# Patient Record
Sex: MALE | Race: WHITE | NOT HISPANIC OR LATINO | Employment: UNEMPLOYED | URBAN - METROPOLITAN AREA
[De-identification: names, ages, dates, MRNs, and addresses within clinical notes are randomized per-mention and may not be internally consistent; named-entity substitution may affect disease eponyms.]

---
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
## 2025-07-18 ENCOUNTER — APPOINTMENT (OUTPATIENT)
Dept: PEDIATRICS | Facility: CLINIC | Age: 20
End: 2025-07-18
Payer: COMMERCIAL

## 2025-07-18 VITALS
WEIGHT: 167 LBS | DIASTOLIC BLOOD PRESSURE: 75 MMHG | SYSTOLIC BLOOD PRESSURE: 138 MMHG | HEART RATE: 76 BPM | BODY MASS INDEX: 24.73 KG/M2 | HEIGHT: 69 IN

## 2025-07-18 DIAGNOSIS — Z91.018 TREE NUT ALLERGY: ICD-10-CM

## 2025-07-18 DIAGNOSIS — Z00.00 WELL ADULT EXAM: Primary | ICD-10-CM

## 2025-07-18 PROBLEM — S62.308A CLOSED FRACTURE OF 5TH METACARPAL: Status: RESOLVED | Noted: 2020-06-10 | Resolved: 2025-07-18

## 2025-07-18 PROBLEM — J34.829 NASAL VALVE COLLAPSE: Status: RESOLVED | Noted: 2018-07-16 | Resolved: 2025-07-18

## 2025-07-18 PROBLEM — M70.42 PREPATELLAR BURSITIS OF LEFT KNEE: Status: RESOLVED | Noted: 2025-07-18 | Resolved: 2025-07-18

## 2025-07-18 PROCEDURE — 90472 IMMUNIZATION ADMIN EACH ADD: CPT | Performed by: PEDIATRICS

## 2025-07-18 PROCEDURE — 90620 MENB-4C VACCINE IM: CPT | Performed by: PEDIATRICS

## 2025-07-18 PROCEDURE — 3008F BODY MASS INDEX DOCD: CPT | Performed by: PEDIATRICS

## 2025-07-18 PROCEDURE — 90651 9VHPV VACCINE 2/3 DOSE IM: CPT | Performed by: PEDIATRICS

## 2025-07-18 PROCEDURE — 90471 IMMUNIZATION ADMIN: CPT | Performed by: PEDIATRICS

## 2025-07-18 PROCEDURE — 1036F TOBACCO NON-USER: CPT | Performed by: PEDIATRICS

## 2025-07-18 PROCEDURE — 99395 PREV VISIT EST AGE 18-39: CPT | Performed by: PEDIATRICS

## 2025-07-18 RX ORDER — IBUPROFEN 600 MG/1
600 TABLET, FILM COATED ORAL EVERY 8 HOURS PRN
COMMUNITY
Start: 2025-05-15

## 2025-07-18 RX ORDER — METHOCARBAMOL 750 MG/1
TABLET, FILM COATED ORAL
COMMUNITY
Start: 2025-05-15

## 2025-07-18 RX ORDER — EPINEPHRINE 0.3 MG/.3ML
1 INJECTION, SOLUTION INTRAMUSCULAR AS NEEDED
Qty: 2 EACH | Refills: 3 | Status: SHIPPED | OUTPATIENT
Start: 2025-07-18

## 2025-07-18 NOTE — PROGRESS NOTES
"Well Child (Pt by himself for  20 yr [facility])      Concerns:    No allergen exposures   Last allergy visit 2022      Has not used albuterol in a few years     Sleep: well rested and waking up well in the morning   Diet:  Varied diet    aqvoiding allergens     good variety and water   [place]:  soft and regular  no issues   Dental:  brushing twice a day and seeing dentist  School:      soccer   not for college anymore  workouts gym     Activities:    [employer]     studio arts      coaching soccer     Drugs/Alcohol/Tobacco/Vaping: discussed   Sexuality/Puberty: discussed   Depression/Moods:      Exam:       height is 1.759 m (5' 9.25\") and weight is 75.8 kg (167 lb). His blood pressure is 138/75 and his pulse is 76.     General: Well-developed, well-nourished, alert and oriented, no acute distress  Eyes: Normal sclera, IGLESIA, EOMI. Red reflex intact, light reflex symmetric.   ENT: Moist mucous membranes, normal throat, no nasal discharge. TMs are normal.  Cardiac:  Normal S1/S2, regular rhythm. Capillary refill less than 2 seconds. No clinically significant murmurs.    Pulmonary: Clear to auscultation bilaterally, no work of breathing.  GI: Soft nontender nondistended abdomen, no HSM, no masses.    Skin: No specific or unusual rashes  Neuro: Symmetric face, no ataxia, grossly normal strength.  Lymph and Neck: No lymphadenopathy, no visible thyroid swelling.  Orthopedic:  normal range of motion of shoulders and normal duck walk, normal spine/no scoliosis    Chaperone Present: Declined.  :   not examined   Assessment and Plan:    Diagnoses and all orders for this visit:  Well adult exam  Other orders  -     1 Year Follow Up; Future  -     Meningococcal B vaccine (BEXSERO)  -     HPV 9-valent vaccine (GARDASIL 9)          We discussed physical activity and nutritional requirements today. Continue to return annually for a checkup and any necessary booster vaccines.   You are responsible for  your own health decisions.  Please make " sure you have access to your medical information via the [employer] [facility]    Type B meningitis vaccine has been available since 2015. Type B meningitis now accounts for 30% of all meningitis cases because the original Type ACWY meningitis vaccine has worked so well. On average there are 1-2 college campuses affected per year with some cases.  We recommend this vaccine to prevent meningitis in late high school and college age children.     Tetanus booster (usually Tdap) should be given 10 years after the last one, typically around age 22 yrs.

## 2025-07-18 NOTE — PATIENT INSTRUCTIONS
Return next year for one lkast physical here and we will do a tdap      Bexsaro today and  HPV  #2